# Patient Record
Sex: FEMALE
[De-identification: names, ages, dates, MRNs, and addresses within clinical notes are randomized per-mention and may not be internally consistent; named-entity substitution may affect disease eponyms.]

---

## 2024-06-13 ENCOUNTER — DASHBOARD ENCOUNTERS (OUTPATIENT)
Age: 69
End: 2024-06-13

## 2024-06-13 PROBLEM — 428882003: Status: ACTIVE | Noted: 2024-06-13

## 2024-06-13 PROBLEM — 311030311000119106: Status: ACTIVE | Noted: 2024-06-13

## 2024-06-14 ENCOUNTER — OFFICE VISIT (OUTPATIENT)
Dept: URBAN - METROPOLITAN AREA CLINIC 7 | Facility: CLINIC | Age: 69
End: 2024-06-14

## 2024-06-14 ENCOUNTER — TELEPHONE ENCOUNTER (OUTPATIENT)
Dept: URBAN - METROPOLITAN AREA CLINIC 7 | Facility: CLINIC | Age: 69
End: 2024-06-14

## 2024-06-14 RX ORDER — ALBUTEROL SULFATE 90 UG/1
AEROSOL, METERED RESPIRATORY (INHALATION)
Qty: 18 GRAM | Status: ACTIVE | COMMUNITY

## 2024-06-14 RX ORDER — ALBUTEROL SULFATE 2.5 MG/3ML
INHALE 1 SOLUTION NEBULIZER EVERY EIGHT HOURS AS NEEDED SOLUTION RESPIRATORY (INHALATION)
Qty: 270 MILLILITER | Refills: 0 | Status: ACTIVE | COMMUNITY

## 2024-06-14 RX ORDER — FUROSEMIDE 20 MG/1
TAKE 1 TABLET BY MOUTH ONCE A DAY TABLET ORAL
Qty: 100 EACH | Refills: 0 | Status: ACTIVE | COMMUNITY

## 2024-06-14 RX ORDER — CELECOXIB 200 MG/1
TAKE ONE CAPSULE BY MOUTH DAILY CAPSULE ORAL
Qty: 30 EACH | Refills: 0 | Status: ACTIVE | COMMUNITY

## 2024-06-14 RX ORDER — ONDANSETRON HYDROCHLORIDE 4 MG/1
TAKE 1 TABLET BY MOUTH ONCE DAILY AS NEEDED TABLET, FILM COATED ORAL
Qty: 90 EACH | Refills: 0 | Status: ACTIVE | COMMUNITY

## 2024-06-14 RX ORDER — DICLOFENAC SODIUM 100 MG/1
TABLET, FILM COATED ORAL
Qty: 30 TABLET | Status: ACTIVE | COMMUNITY

## 2024-06-14 RX ORDER — ASPIRIN 81 MG/1
TAKE 1 TABLET BY MOUTH ONCE DAILY TABLET, COATED ORAL
Qty: 90 EACH | Refills: 0 | Status: ACTIVE | COMMUNITY

## 2024-06-14 RX ORDER — ROSUVASTATIN CALCIUM 5 MG/1
TABLET, FILM COATED ORAL
Qty: 90 TABLET | Status: ACTIVE | COMMUNITY

## 2024-06-14 RX ORDER — OLMESARTAN MEDOXOMIL AND HYDROCHLOROTHIAZIDE 40; 25 MG/1; MG/1
TABLET, FILM COATED ORAL
Qty: 100 TABLET | Status: ACTIVE | COMMUNITY

## 2024-06-14 RX ORDER — AMLODIPINE BESYLATE 2.5 MG/1
TABLET ORAL
Qty: 100 TABLET | Status: ACTIVE | COMMUNITY

## 2024-06-14 RX ORDER — CHLORDIAZEPOXIDE HYDROCHLORIDE 5 MG/1
TAKE 1 CAPSULE BY MOUTH DAILY CAPSULE ORAL
Qty: 30 EACH | Refills: 0 | Status: ACTIVE | COMMUNITY

## 2024-06-14 RX ORDER — TRAMADOL HYDROCHLORIDE 50 MG/1
TAKE 1 TABLET BY MOUTH 3 TIMES A DAY TABLET, COATED ORAL
Qty: 90 EACH | Refills: 0 | Status: ACTIVE | COMMUNITY

## 2024-06-14 RX ORDER — OMEPRAZOLE 40 MG/1
CAPSULE, DELAYED RELEASE ORAL
Qty: 90 CAPSULE | Status: ACTIVE | COMMUNITY

## 2024-06-14 RX ORDER — SITAGLIPTIN 50 MG/1
TABLET, FILM COATED ORAL
Qty: 90 TABLET | Status: ACTIVE | COMMUNITY

## 2024-06-14 RX ORDER — METFORMIN HYDROCHLORIDE 500 MG/1
TABLET ORAL
Qty: 200 TABLET | Status: ACTIVE | COMMUNITY

## 2024-06-14 RX ORDER — BUDESONIDE AND FORMOTEROL FUMARATE DIHYDRATE 160; 4.5 UG/1; UG/1
AEROSOL RESPIRATORY (INHALATION)
Qty: 10.2 GRAM | Status: ACTIVE | COMMUNITY

## 2024-06-14 RX ORDER — GABAPENTIN 300 MG/1
TAKE 1 CAPSULE BY MOUTH EVERY EIGHT HOURS CAPSULE ORAL
Qty: 270 EACH | Refills: 0 | Status: ACTIVE | COMMUNITY

## 2024-06-14 RX ORDER — LEVOTHYROXINE SODIUM 0.12 MG/1
TABLET ORAL
Qty: 90 TABLET | Status: ACTIVE | COMMUNITY

## 2024-06-14 NOTE — HPI-TODAY'S VISIT:
Patient is new to the practice and is being evaluated for possible colonoscopy.  She does have a history of obesity and diabetes.  She did have a recent hospitalization at the end of April 2024 for evidence of a small bowel obstruction.  This occurred after she had pork tacos and then felt nauseated and vomited multiple times.  Did have some sharp epigastric abdominal discomfort as well.  CT scan of the abdomen pelvis [place] demonstrated small bowel findings suggestive of early mild obstruction.  Labs in the ER demonstrated white count of 13.7, hemoglobin 12.8, creatinine 0.59, normal LFTs.  No prior colonoscopy.  Does have a history of atrial fibrillation status post ablation.  That time she was evaluated in the hospital after going there from urgent care her symptoms had resolved.  She was advised to follow-up with gastroenterology for colonoscopy.  Labs in February 2024 demonstrated hemoglobin of 12.1, platelets 353, creatinine 0.76, normal LFTs.  Hemoglobin A1c of 6.1%.  Ultrasound of the abdomen in February 2024 demonstrated that she is status post cholecystectomy, normal bile duct dilation.

## 2025-01-29 PROBLEM — 79922009: Status: ACTIVE | Noted: 2025-01-29

## 2025-01-30 ENCOUNTER — OFFICE VISIT (OUTPATIENT)
Dept: URBAN - METROPOLITAN AREA CLINIC 7 | Facility: CLINIC | Age: 70
End: 2025-01-30

## 2025-01-30 ENCOUNTER — TELEPHONE ENCOUNTER (OUTPATIENT)
Dept: URBAN - METROPOLITAN AREA CLINIC 7 | Facility: CLINIC | Age: 70
End: 2025-01-30

## 2025-01-30 RX ORDER — AMLODIPINE BESYLATE 2.5 MG/1
TABLET ORAL
Qty: 100 TABLET | Status: ACTIVE | COMMUNITY

## 2025-01-30 RX ORDER — FUROSEMIDE 20 MG/1
TAKE 1 TABLET BY MOUTH ONCE A DAY TABLET ORAL
Qty: 100 EACH | Refills: 0 | Status: ACTIVE | COMMUNITY

## 2025-01-30 RX ORDER — DICLOFENAC SODIUM 100 MG/1
TABLET, FILM COATED ORAL
Qty: 30 TABLET | Status: ACTIVE | COMMUNITY

## 2025-01-30 RX ORDER — ROSUVASTATIN CALCIUM 5 MG/1
TABLET, FILM COATED ORAL
Qty: 90 TABLET | Status: ACTIVE | COMMUNITY

## 2025-01-30 RX ORDER — CELECOXIB 200 MG/1
TAKE ONE CAPSULE BY MOUTH DAILY CAPSULE ORAL
Qty: 30 EACH | Refills: 0 | Status: ACTIVE | COMMUNITY

## 2025-01-30 RX ORDER — OLMESARTAN MEDOXOMIL AND HYDROCHLOROTHIAZIDE 40; 25 MG/1; MG/1
TABLET, FILM COATED ORAL
Qty: 100 TABLET | Status: ACTIVE | COMMUNITY

## 2025-01-30 RX ORDER — TRAMADOL HYDROCHLORIDE 50 MG/1
TAKE 1 TABLET BY MOUTH 3 TIMES A DAY TABLET, COATED ORAL
Qty: 90 EACH | Refills: 0 | Status: ACTIVE | COMMUNITY

## 2025-01-30 RX ORDER — CHLORDIAZEPOXIDE HYDROCHLORIDE 5 MG/1
TAKE 1 CAPSULE BY MOUTH DAILY CAPSULE ORAL
Qty: 30 EACH | Refills: 0 | Status: ACTIVE | COMMUNITY

## 2025-01-30 RX ORDER — GABAPENTIN 300 MG/1
TAKE 1 CAPSULE BY MOUTH EVERY EIGHT HOURS CAPSULE ORAL
Qty: 270 EACH | Refills: 0 | Status: ACTIVE | COMMUNITY

## 2025-01-30 RX ORDER — LEVOTHYROXINE SODIUM 0.12 MG/1
TABLET ORAL
Qty: 90 TABLET | Status: ACTIVE | COMMUNITY

## 2025-01-30 RX ORDER — ALBUTEROL SULFATE 90 UG/1
AEROSOL, METERED RESPIRATORY (INHALATION)
Qty: 18 GRAM | Status: ACTIVE | COMMUNITY

## 2025-01-30 RX ORDER — ALBUTEROL SULFATE 2.5 MG/3ML
INHALE 1 SOLUTION NEBULIZER EVERY EIGHT HOURS AS NEEDED SOLUTION RESPIRATORY (INHALATION)
Qty: 270 MILLILITER | Refills: 0 | Status: ACTIVE | COMMUNITY

## 2025-01-30 RX ORDER — ASPIRIN 81 MG/1
TAKE 1 TABLET BY MOUTH ONCE DAILY TABLET, COATED ORAL
Qty: 90 EACH | Refills: 0 | Status: ACTIVE | COMMUNITY

## 2025-01-30 RX ORDER — BUDESONIDE AND FORMOTEROL FUMARATE DIHYDRATE 160; 4.5 UG/1; UG/1
AEROSOL RESPIRATORY (INHALATION)
Qty: 10.2 GRAM | Status: ACTIVE | COMMUNITY

## 2025-01-30 RX ORDER — ONDANSETRON HYDROCHLORIDE 4 MG/1
TAKE 1 TABLET BY MOUTH ONCE DAILY AS NEEDED TABLET, FILM COATED ORAL
Qty: 90 EACH | Refills: 0 | Status: ACTIVE | COMMUNITY

## 2025-01-30 RX ORDER — SITAGLIPTIN 50 MG/1
TABLET, FILM COATED ORAL
Qty: 90 TABLET | Status: ACTIVE | COMMUNITY

## 2025-01-30 RX ORDER — OMEPRAZOLE 40 MG/1
CAPSULE, DELAYED RELEASE ORAL
Qty: 90 CAPSULE | Status: ACTIVE | COMMUNITY

## 2025-01-30 RX ORDER — METFORMIN HYDROCHLORIDE 500 MG/1
TABLET ORAL
Qty: 200 TABLET | Status: ACTIVE | COMMUNITY

## 2025-01-30 NOTE — HPI-TODAY'S VISIT:
Patient is new to the practice and is being evaluated for possible colonoscopy.  She does have a history of obesity and diabetes.  She did have a recent hospitalization at the end of April 2024 for evidence of a small bowel obstruction.  This occurred after she had pork tacos and then felt nauseated and vomited multiple times.  Did have some sharp epigastric abdominal discomfort as well.  CT scan of the abdomen pelvis [place] demonstrated small bowel findings suggestive of early mild obstruction.  Labs in the ER demonstrated white count of 13.7, hemoglobin 12.8, creatinine 0.59, normal LFTs.  No prior colonoscopy.  Does have a history of atrial fibrillation status post ablation.  That time she was evaluated in the hospital after going there from urgent care her symptoms had resolved.  She was advised to follow-up with gastroenterology for colonoscopy.  Labs in February 2024 demonstrated hemoglobin of 12.1, platelets 353, creatinine 0.76, normal LFTs.  Hemoglobin A1c of 6.1%.  Ultrasound of the abdomen in February 2024 demonstrated that she is status post cholecystectomy, normal bile duct dilation. Had hillary in 2023.